# Patient Record
Sex: MALE | Race: WHITE | Employment: FULL TIME | URBAN - METROPOLITAN AREA
[De-identification: names, ages, dates, MRNs, and addresses within clinical notes are randomized per-mention and may not be internally consistent; named-entity substitution may affect disease eponyms.]

---

## 2017-01-14 ENCOUNTER — HOSPITAL ENCOUNTER (EMERGENCY)
Age: 47
Discharge: HOME | End: 2017-01-14
Attending: FAMILY MEDICINE

## 2017-01-14 VITALS
WEIGHT: 231 LBS | RESPIRATION RATE: 16 BRPM | HEART RATE: 84 BPM | TEMPERATURE: 97.5 F | HEIGHT: 70 IN | OXYGEN SATURATION: 99 % | SYSTOLIC BLOOD PRESSURE: 138 MMHG | BODY MASS INDEX: 33.07 KG/M2 | DIASTOLIC BLOOD PRESSURE: 83 MMHG

## 2017-01-14 DIAGNOSIS — E11.8 TYPE 2 DIABETES MELLITUS WITH COMPLICATION, WITHOUT LONG-TERM CURRENT USE OF INSULIN (HCC): Primary | ICD-10-CM

## 2017-01-14 RX ORDER — GLIPIZIDE 10 MG/1
10 TABLET ORAL 2 TIMES DAILY
Qty: 100 TAB | Refills: 0 | Status: SHIPPED | OUTPATIENT
Start: 2017-01-14

## 2017-01-14 RX ORDER — GUAIFENESIN 100 MG/5ML
81 LIQUID (ML) ORAL DAILY
COMMUNITY

## 2017-01-14 RX ORDER — METFORMIN HYDROCHLORIDE 500 MG/1
1000 TABLET ORAL 2 TIMES DAILY WITH MEALS
COMMUNITY

## 2017-01-14 RX ORDER — GLIPIZIDE 10 MG/1
10 TABLET ORAL 2 TIMES DAILY
COMMUNITY

## 2017-01-14 NOTE — UC PROVIDER NOTE
HPI Comments: His job has extended him multiple times. He has not run out of meds. He presents with his prescription bottle showing he takes Glipizide 10mg BID. Patient is a 40 y.o. male presenting with medication refill. The history is provided by the patient. Medication Refill   This is a new problem. Past Medical History   Diagnosis Date    Diabetes [facility])         Past Surgical History   Procedure Laterality Date    Hx gi       colon resection.  Hx appendectomy      Hx heent       tonsilectomy         History reviewed. No pertinent family history. Social History     Social History    Marital status: SINGLE     Spouse name: N/A    Number of children: N/A    Years of education: N/A     Occupational History    Not on file. Social History Main Topics    Smoking status: Never Smoker    Smokeless tobacco: Not on file    Alcohol use Not on file    Drug use: Not on file    Sexual activity: Not on file     Other Topics Concern    Not on file     Social History Narrative    No narrative on file                ALLERGIES: Review of patient's allergies indicates no known allergies. Review of Systems    Vitals:    01/14/17 1154   BP: 138/83   Pulse: 84   Resp: 16   Temp: 97.5 °F (36.4 °C)   SpO2: 99%   Weight: 104.8 kg (231 lb)   Height: 5' 10\" (1.778 m)       Physical Exam   Constitutional: He appears well-developed and well-nourished. Neurological: He is alert. Psychiatric: He has a normal mood and affect. His behavior is normal. Thought content normal.   Nursing note and vitals reviewed.       MDM     Differential Diagnosis; Clinical Impression; Plan:     DM- will continue meds  Progress:   Patient progress:  Stable      Procedures

## 2017-01-14 NOTE — DISCHARGE INSTRUCTIONS
